# Patient Record
(demographics unavailable — no encounter records)

---

## 2024-10-15 NOTE — HISTORY OF PRESENT ILLNESS
[de-identified] : Never [FreeTextEntry1] : ~2016 no polyps per patient. around time of diverticulitis requiring resection -

## 2024-10-15 NOTE — PHYSICAL EXAM
[Alert] : alert [Normal Voice/Communication] : normal voice/communication [Healthy Appearing] : healthy appearing [No Acute Distress] : no acute distress [Sclera] : the sclera and conjunctiva were normal [Hearing Threshold Finger Rub Not Bucks] : hearing was normal [Normal Appearance] : the appearance of the neck was normal [No Respiratory Distress] : no respiratory distress [No Acc Muscle Use] : no accessory muscle use [Respiration, Rhythm And Depth] : normal respiratory rhythm and effort [Heart Rate And Rhythm] : heart rate was normal and rhythm regular [Abdomen Tenderness] : non-tender [No Masses] : no abdominal mass palpated [Abdomen Soft] : soft [Abnormal Walk] : normal gait [Normal Color / Pigmentation] : normal skin color and pigmentation [No Focal Deficits] : no focal deficits [Oriented To Time, Place, And Person] : oriented to person, place, and time